# Patient Record
Sex: MALE | NOT HISPANIC OR LATINO | URBAN - METROPOLITAN AREA
[De-identification: names, ages, dates, MRNs, and addresses within clinical notes are randomized per-mention and may not be internally consistent; named-entity substitution may affect disease eponyms.]

---

## 2017-08-21 ENCOUNTER — IMPORTED ENCOUNTER (OUTPATIENT)
Dept: URBAN - METROPOLITAN AREA CLINIC 1 | Facility: CLINIC | Age: 27
End: 2017-08-21

## 2017-08-21 PROCEDURE — 92015 DETERMINE REFRACTIVE STATE: CPT

## 2017-08-21 PROCEDURE — 92004 COMPRE OPH EXAM NEW PT 1/>: CPT

## 2017-08-21 NOTE — PATIENT DISCUSSION
1.  Headache likely secondary to straining from Astigmatism/Myopia. Patient to get new MRX and to f/u if symptoms do not resolve or worsen. 2. Return for an appointment for 1yr/30 with Dr. Mark Schultz.

## 2022-04-02 ASSESSMENT — VISUAL ACUITY
OD_SC: J1+
OS_CC: 20/30
OD_CC: 20/30
OS_SC: J1+

## 2022-04-02 ASSESSMENT — TONOMETRY
OS_IOP_MMHG: 17
OD_IOP_MMHG: 16